# Patient Record
Sex: FEMALE | Race: AMERICAN INDIAN OR ALASKA NATIVE
[De-identification: names, ages, dates, MRNs, and addresses within clinical notes are randomized per-mention and may not be internally consistent; named-entity substitution may affect disease eponyms.]

---

## 2019-06-26 LAB
ALBUMIN SERPL-MCNC: 4 G/DL (ref 3.9–5)
ALT SERPL-CCNC: 23 UNITS/L (ref 7–56)
BASOPHILS # (AUTO): 0.1 K/MM3 (ref 0–0.1)
BASOPHILS NFR BLD AUTO: 1.8 % (ref 0–1.8)
BUN SERPL-MCNC: 13 MG/DL (ref 7–17)
BUN/CREAT SERPL: 19 %
CALCIUM SERPL-MCNC: 9.2 MG/DL (ref 8.4–10.2)
EOSINOPHIL # BLD AUTO: 0.1 K/MM3 (ref 0–0.4)
EOSINOPHIL NFR BLD AUTO: 4.1 % (ref 0–4.3)
HCT VFR BLD CALC: 37.3 % (ref 30.3–42.9)
HDLC SERPL-MCNC: 83 MG/DL (ref 40–59)
HEMOLYSIS INDEX: 8
HGB BLD-MCNC: 12.5 GM/DL (ref 10.1–14.3)
LYMPHOCYTES # BLD AUTO: 1.2 K/MM3 (ref 1.2–5.4)
LYMPHOCYTES NFR BLD AUTO: 41 % (ref 13.4–35)
MCHC RBC AUTO-ENTMCNC: 33 % (ref 30–34)
MCV RBC AUTO: 92 FL (ref 79–97)
MONOCYTES # (AUTO): 0.2 K/MM3 (ref 0–0.8)
MONOCYTES % (AUTO): 7.5 % (ref 0–7.3)
PLATELET # BLD: 244 K/MM3 (ref 140–440)
RBC # BLD AUTO: 4.07 M/MM3 (ref 3.65–5.03)

## 2019-06-26 NOTE — ANESTHESIA CONSULTATION
Anesthesia Consult and Med Hx


Date of service: 07/02/19





- Airway


Anesthetic Teeth Evaluation: Edentulous


ROM Head & Neck: Adequate


Mental/Hyoid Distance: Adequate


Mallampati Class: Class I


Intubation Access Assessment: Good





- Pre-Operative Health Status


ASA Pre-Surgery Classification: ASA2


Proposed Anesthetic Plan: General





- Pulmonary


Hx Asthma: No (HX PE 2011-resolved)


Hx Sleep Apnea: No (Yes, in the past; resolved after weight loss. Neg sleep 

study)





- Cardiovascular System


Hx Hypertension: Yes





- Central Nervous System


Hx Psychiatric Problems: Yes (Depression)





- Gastrointestinal


Hx Gastroesophageal Reflux Disease: Yes





- Hematic


Hx Anemia: Yes

## 2019-07-02 ENCOUNTER — HOSPITAL ENCOUNTER (INPATIENT)
Dept: HOSPITAL 5 - 3A | Age: 64
LOS: 1 days | Discharge: HOME | DRG: 327 | End: 2019-07-03
Attending: SPECIALIST | Admitting: SPECIALIST
Payer: MEDICARE

## 2019-07-02 DIAGNOSIS — K44.9: ICD-10-CM

## 2019-07-02 DIAGNOSIS — I10: ICD-10-CM

## 2019-07-02 DIAGNOSIS — K59.01: ICD-10-CM

## 2019-07-02 DIAGNOSIS — F32.9: ICD-10-CM

## 2019-07-02 DIAGNOSIS — Z90.49: ICD-10-CM

## 2019-07-02 DIAGNOSIS — K30: ICD-10-CM

## 2019-07-02 DIAGNOSIS — K95.89: Primary | ICD-10-CM
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
## 2019-07-02 DIAGNOSIS — Z79.899: ICD-10-CM
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
## 2019-07-02 DIAGNOSIS — Z86.711: ICD-10-CM

## 2019-07-02 DIAGNOSIS — K21.9: ICD-10-CM

## 2019-07-02 DIAGNOSIS — E66.01: ICD-10-CM

## 2019-07-02 PROCEDURE — 84443 ASSAY THYROID STIM HORMONE: CPT

## 2019-07-02 PROCEDURE — 94760 N-INVAS EAR/PLS OXIMETRY 1: CPT

## 2019-07-02 PROCEDURE — 80053 COMPREHEN METABOLIC PANEL: CPT

## 2019-07-02 PROCEDURE — A4217 STERILE WATER/SALINE, 500 ML: HCPCS

## 2019-07-02 PROCEDURE — 80048 BASIC METABOLIC PNL TOTAL CA: CPT

## 2019-07-02 PROCEDURE — 0DB64ZZ EXCISION OF STOMACH, PERCUTANEOUS ENDOSCOPIC APPROACH: ICD-10-PCS | Performed by: SPECIALIST

## 2019-07-02 PROCEDURE — 36415 COLL VENOUS BLD VENIPUNCTURE: CPT

## 2019-07-02 PROCEDURE — 85025 COMPLETE CBC W/AUTO DIFF WBC: CPT

## 2019-07-02 PROCEDURE — 0D160ZA BYPASS STOMACH TO JEJUNUM, OPEN APPROACH: ICD-10-PCS | Performed by: SPECIALIST

## 2019-07-02 PROCEDURE — 0BQT4ZZ REPAIR DIAPHRAGM, PERCUTANEOUS ENDOSCOPIC APPROACH: ICD-10-PCS | Performed by: SPECIALIST

## 2019-07-02 PROCEDURE — 80061 LIPID PANEL: CPT

## 2019-07-02 PROCEDURE — 88307 TISSUE EXAM BY PATHOLOGIST: CPT

## 2019-07-02 RX ADMIN — SODIUM CHLORIDE, SODIUM LACTATE, POTASSIUM CHLORIDE, AND CALCIUM CHLORIDE SCH MLS/HR: .6; .31; .03; .02 INJECTION, SOLUTION INTRAVENOUS at 06:50

## 2019-07-02 RX ADMIN — KETOROLAC TROMETHAMINE SCH MG: 30 INJECTION, SOLUTION INTRAMUSCULAR at 15:38

## 2019-07-02 RX ADMIN — KETOROLAC TROMETHAMINE SCH MG: 30 INJECTION, SOLUTION INTRAMUSCULAR at 22:16

## 2019-07-02 RX ADMIN — HYDROCODONE BITARTRATE AND ACETAMINOPHEN SCH EACH: 10; 325 TABLET ORAL at 22:02

## 2019-07-02 RX ADMIN — SIMETHICONE PRN MG: 80 TABLET, CHEWABLE ORAL at 22:02

## 2019-07-02 RX ADMIN — SIMETHICONE PRN MG: 80 TABLET, CHEWABLE ORAL at 14:36

## 2019-07-02 RX ADMIN — SODIUM CHLORIDE, SODIUM LACTATE, POTASSIUM CHLORIDE, AND CALCIUM CHLORIDE SCH MLS/HR: .6; .31; .03; .02 INJECTION, SOLUTION INTRAVENOUS at 22:03

## 2019-07-02 RX ADMIN — SODIUM CHLORIDE, SODIUM LACTATE, POTASSIUM CHLORIDE, AND CALCIUM CHLORIDE SCH MLS/HR: .6; .31; .03; .02 INJECTION, SOLUTION INTRAVENOUS at 14:30

## 2019-07-02 RX ADMIN — KETOROLAC TROMETHAMINE SCH MG: 30 INJECTION, SOLUTION INTRAMUSCULAR at 10:35

## 2019-07-02 RX ADMIN — HYDROCODONE BITARTRATE AND ACETAMINOPHEN SCH EACH: 10; 325 TABLET ORAL at 19:39

## 2019-07-02 RX ADMIN — SODIUM CHLORIDE, SODIUM LACTATE, POTASSIUM CHLORIDE, AND CALCIUM CHLORIDE SCH MLS/HR: .6; .31; .03; .02 INJECTION, SOLUTION INTRAVENOUS at 11:35

## 2019-07-02 RX ADMIN — METOCLOPRAMIDE PRN MG: 5 INJECTION, SOLUTION INTRAMUSCULAR; INTRAVENOUS at 22:03

## 2019-07-03 VITALS — SYSTOLIC BLOOD PRESSURE: 133 MMHG | DIASTOLIC BLOOD PRESSURE: 56 MMHG

## 2019-07-03 LAB
BASOPHILS # (AUTO): 0 K/MM3 (ref 0–0.1)
BASOPHILS NFR BLD AUTO: 0.4 % (ref 0–1.8)
BUN SERPL-MCNC: 8 MG/DL (ref 7–17)
BUN/CREAT SERPL: 9 %
CALCIUM SERPL-MCNC: 8.5 MG/DL (ref 8.4–10.2)
EOSINOPHIL # BLD AUTO: 0 K/MM3 (ref 0–0.4)
EOSINOPHIL NFR BLD AUTO: 0.4 % (ref 0–4.3)
HCT VFR BLD CALC: 32.5 % (ref 30.3–42.9)
HEMOLYSIS INDEX: 3
HGB BLD-MCNC: 11 GM/DL (ref 10.1–14.3)
LYMPHOCYTES # BLD AUTO: 1.6 K/MM3 (ref 1.2–5.4)
LYMPHOCYTES NFR BLD AUTO: 21.6 % (ref 13.4–35)
MCHC RBC AUTO-ENTMCNC: 34 % (ref 30–34)
MCV RBC AUTO: 91 FL (ref 79–97)
MONOCYTES # (AUTO): 0.6 K/MM3 (ref 0–0.8)
MONOCYTES % (AUTO): 7.8 % (ref 0–7.3)
PLATELET # BLD: 196 K/MM3 (ref 140–440)
RBC # BLD AUTO: 3.57 M/MM3 (ref 3.65–5.03)

## 2019-07-03 RX ADMIN — KETOROLAC TROMETHAMINE SCH: 30 INJECTION, SOLUTION INTRAMUSCULAR at 03:00

## 2019-07-03 RX ADMIN — KETOROLAC TROMETHAMINE SCH: 30 INJECTION, SOLUTION INTRAMUSCULAR at 12:42

## 2019-07-03 RX ADMIN — SODIUM CHLORIDE, SODIUM LACTATE, POTASSIUM CHLORIDE, AND CALCIUM CHLORIDE SCH MLS/HR: .6; .31; .03; .02 INJECTION, SOLUTION INTRAVENOUS at 06:31

## 2019-07-03 RX ADMIN — SIMETHICONE PRN MG: 80 TABLET, CHEWABLE ORAL at 08:24

## 2019-07-03 RX ADMIN — METOCLOPRAMIDE PRN MG: 5 INJECTION, SOLUTION INTRAMUSCULAR; INTRAVENOUS at 08:24

## 2019-07-03 RX ADMIN — HYDROCODONE BITARTRATE AND ACETAMINOPHEN SCH EACH: 10; 325 TABLET ORAL at 09:36

## 2019-07-03 NOTE — DISCHARGE SUMMARY
Providers





- Providers


Date of Admission: 


07/02/19 06:07





Date of discharge: 07/03/19


Attending physician: 


HILDA SAHA





Primary care physician: 


CARMEN LEUNG








Hospitalization


Reason for admission: postop


Condition: Good


Procedures: 





7/2/19: Laparoscopic partial gastrectomy, HHR


Hospital course: 





64F admitted after her operation for routine monitoring. She was managed on the 

general surgical floor. Her BP was high after surgery, this resolved with 

medication and was lower on the day of discharge. Patient ambulated, tolerated a

CLD, pain under control, and was dc home POD1. 


Disposition: DC-01 TO HOME OR SELFCARE





Core Measure Documentation





- Palliative Care


Palliative Care/ Comfort Measures: Not Applicable





- Core Measures


Any of the following diagnoses?: none





- VTE Discharge Requirements


Deep Vein Thrombosis/Pulmonary Embolism Present on Admission: No





- Acute MI Discharge Requirements


Aspirin at discharge: No


Reason for no aspirin on DC: Surgical contraindication





- Heart Failure Discharge Requirements


ACE/ARB for LVSD if EF <40%: Not Applicable





- Stroke Discharge Requirements


Statin for LDL = or >70 mg/dl on DC: Not Applicable





Exam





- Physical Exam


Narrative exam: 





Gen: AAO, NAD


Heart: RRR


Lungs: Clear


Abd: Obese, soft, NT, ND. Bandages c/d/i. 


Ext: No LE Edema





- Constitutional


Vitals: 


                                        











Temp Pulse Resp BP Pulse Ox


 


 98.5 F   61   20   91/56   99 


 


 07/03/19 00:20  07/03/19 04:57  07/03/19 04:57  07/03/19 04:57  07/03/19 00:20














Plan


Diet: clear liquids


Wound: keep clean and dry


Special Instructions: no heavy lifting


Additional Instructions: Erasmo Saha as scheduled


Follow up with: 


CARMEN LEUNG MD [Primary Care Provider] - 7 Days

## 2019-07-04 ENCOUNTER — HOSPITAL ENCOUNTER (INPATIENT)
Dept: HOSPITAL 5 - ED | Age: 64
LOS: 3 days | Discharge: HOME | DRG: 394 | End: 2019-07-07
Attending: INTERNAL MEDICINE | Admitting: INTERNAL MEDICINE
Payer: MEDICARE

## 2019-07-04 DIAGNOSIS — J94.8: ICD-10-CM

## 2019-07-04 DIAGNOSIS — K95.89: Primary | ICD-10-CM
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
## 2019-07-04 DIAGNOSIS — K21.0: ICD-10-CM

## 2019-07-04 DIAGNOSIS — Z90.49: ICD-10-CM

## 2019-07-04 DIAGNOSIS — Z82.49: ICD-10-CM
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
## 2019-07-04 DIAGNOSIS — E66.01: ICD-10-CM

## 2019-07-04 DIAGNOSIS — K22.2: ICD-10-CM

## 2019-07-04 DIAGNOSIS — F32.9: ICD-10-CM

## 2019-07-04 DIAGNOSIS — Z86.711: ICD-10-CM

## 2019-07-04 DIAGNOSIS — Z79.899: ICD-10-CM

## 2019-07-04 LAB
BASOPHILS # (AUTO): 0 K/MM3 (ref 0–0.1)
BASOPHILS NFR BLD AUTO: 0.7 % (ref 0–1.8)
BUN SERPL-MCNC: 8 MG/DL (ref 7–17)
BUN/CREAT SERPL: 11 %
CALCIUM SERPL-MCNC: 9 MG/DL (ref 8.4–10.2)
EOSINOPHIL # BLD AUTO: 0.2 K/MM3 (ref 0–0.4)
EOSINOPHIL NFR BLD AUTO: 3.6 % (ref 0–4.3)
HCT VFR BLD CALC: 35.2 % (ref 30.3–42.9)
HEMOLYSIS INDEX: 8
HGB BLD-MCNC: 12 GM/DL (ref 10.1–14.3)
LYMPHOCYTES # BLD AUTO: 1.2 K/MM3 (ref 1.2–5.4)
LYMPHOCYTES NFR BLD AUTO: 20.7 % (ref 13.4–35)
MCHC RBC AUTO-ENTMCNC: 34 % (ref 30–34)
MCV RBC AUTO: 92 FL (ref 79–97)
MONOCYTES # (AUTO): 0.5 K/MM3 (ref 0–0.8)
MONOCYTES % (AUTO): 7.9 % (ref 0–7.3)
PLATELET # BLD: 214 K/MM3 (ref 140–440)
RBC # BLD AUTO: 3.84 M/MM3 (ref 3.65–5.03)

## 2019-07-04 PROCEDURE — 93005 ELECTROCARDIOGRAM TRACING: CPT

## 2019-07-04 PROCEDURE — 71045 X-RAY EXAM CHEST 1 VIEW: CPT

## 2019-07-04 PROCEDURE — 96375 TX/PRO/DX INJ NEW DRUG ADDON: CPT

## 2019-07-04 PROCEDURE — 93010 ELECTROCARDIOGRAM REPORT: CPT

## 2019-07-04 PROCEDURE — 96374 THER/PROPH/DIAG INJ IV PUSH: CPT

## 2019-07-04 PROCEDURE — 84484 ASSAY OF TROPONIN QUANT: CPT

## 2019-07-04 PROCEDURE — 80048 BASIC METABOLIC PNL TOTAL CA: CPT

## 2019-07-04 PROCEDURE — 74177 CT ABD & PELVIS W/CONTRAST: CPT

## 2019-07-04 PROCEDURE — 80053 COMPREHEN METABOLIC PANEL: CPT

## 2019-07-04 PROCEDURE — 83735 ASSAY OF MAGNESIUM: CPT

## 2019-07-04 PROCEDURE — 85025 COMPLETE CBC W/AUTO DIFF WBC: CPT

## 2019-07-04 PROCEDURE — 85027 COMPLETE CBC AUTOMATED: CPT

## 2019-07-04 PROCEDURE — C9113 INJ PANTOPRAZOLE SODIUM, VIA: HCPCS

## 2019-07-04 PROCEDURE — 83036 HEMOGLOBIN GLYCOSYLATED A1C: CPT

## 2019-07-04 PROCEDURE — 36415 COLL VENOUS BLD VENIPUNCTURE: CPT

## 2019-07-04 RX ADMIN — HYDROMORPHONE HYDROCHLORIDE PRN MG: 1 INJECTION, SOLUTION INTRAMUSCULAR; INTRAVENOUS; SUBCUTANEOUS at 23:38

## 2019-07-04 RX ADMIN — ONDANSETRON PRN MG: 2 INJECTION INTRAMUSCULAR; INTRAVENOUS at 20:29

## 2019-07-04 RX ADMIN — HYDROMORPHONE HYDROCHLORIDE PRN MG: 1 INJECTION, SOLUTION INTRAMUSCULAR; INTRAVENOUS; SUBCUTANEOUS at 20:29

## 2019-07-04 RX ADMIN — Medication SCH ML: at 23:38

## 2019-07-04 RX ADMIN — HEPARIN SODIUM SCH UNIT: 5000 INJECTION, SOLUTION INTRAVENOUS; SUBCUTANEOUS at 21:26

## 2019-07-04 RX ADMIN — HYDROMORPHONE HYDROCHLORIDE PRN MG: 1 INJECTION, SOLUTION INTRAMUSCULAR; INTRAVENOUS; SUBCUTANEOUS at 20:32

## 2019-07-04 RX ADMIN — ONDANSETRON PRN MG: 2 INJECTION INTRAMUSCULAR; INTRAVENOUS at 20:31

## 2019-07-04 NOTE — CAT SCAN REPORT
CT abdomen pelvis w con



INDICATION:

MAIN: Abdominal surgery (previous gastric bypass and going in again) on Tues.  N/V since Tuesday..



TECHNIQUE:

All CT scans at this location are performed using the following dose modulation technique: Automated 
exposure control. Helical slices were obtained through the abdomen and pelvis. 100 cc of Omnipaque 30
0 is administered. 



COMPARISON:

None available.



FINDINGS:

Abdomen: There is mild acetabular atelectasis in the lungs. Changes of gastric bypass are noted. Esop
hagus is distended with air with a very small amount of layering fluid in the midesophagus. There is 
a small amount of air in the right abdominal wall. No free intraperitoneal air is seen. Changes of pr
ior cholecystectomy are noted. There is an IVC filter.



Small cyst in the upper pole the left kidney and a small cyst in the mid right kidney. There is no ad
enopathy.





On review of bone windows, no acute osseous abnormalities are seen.





Pelvis: There is no obstruction or inflammation. Bowel is unremarkable. There are phleboliths.



IMPRESSION: Changes of gastric bypass are noted. The esophagus is distended with air and contains a v
tito small amount of fluid. This could represent reflux. Possibility of obstruction or stricture at th
e level the gastric remnant is considered as well.



There is no free air. There are no abnormal fluid collections.



There is a small amount of gas in the dominant wall right.



Signer Name: Chris Rush MD 

Signed: 7/4/2019 12:15 PM

 Workstation Name: [id number]

## 2019-07-04 NOTE — XRAY REPORT
CHEST 1 VIEW 



INDICATION / CLINICAL INFORMATION:

Chest Pain.



COMPARISON: 

None available.



FINDINGS: Patient is rotated.



SUPPORT DEVICES: None.

HEART / MEDIASTINUM: No significant abnormality. 

LUNGS / PLEURA: There is blunting of the right costophrenic angle characteristic of a small amount of
 pleural fluid or pleural thickening..  No pneumothorax. There is minimal linear scar or atelectasis 
in the left lower lung zone



ADDITIONAL FINDINGS: No significant additional findings.



IMPRESSION:

1. There is a small amount of right pleural fluid or pleural thickening. No pneumothorax is seen.



Signer Name: Chris Rush MD 

Signed: 7/4/2019 10:17 AM

 Workstation Name: [id number]

## 2019-07-04 NOTE — EMERGENCY DEPARTMENT REPORT
ED General Adult HPI





- General


Chief complaint: Chest Pain


Stated complaint: CHEST DISCOMFORT/CAN'T EAT


Time Seen by Provider: 07/04/19 09:48


Source: patient


Mode of arrival: Ambulatory


Limitations: No Limitations





- History of Present Illness


Initial comments: 


She is a 64-year-old female status post gastric bypass and hernia repair who 

presents with nausea and vomiting and inability to eat.  Patient states that 

ever since she had her surgery she's not been able to swallow any food or liquid

down.  Patient was seen by Dr. Saha she states that








- Related Data


                                Home Medications











 Medication  Instructions  Recorded  Confirmed  Last Taken


 


HYDROcodone/APAP  [Norco 1 tab PO BID 06/28/19 06/28/19 07/01/19 21:00





 mg TAB]    


 


Losartan/Hydrochlorothiazide 1 each PO DAILY 06/28/19 06/28/19 07/01/19 21:00





[Hyzaar 100-12.5 Tablet]    


 


Ranitidine HCl [Zantac] 150 mg PO DAILY 06/28/19 06/28/19 07/01/19 21:00


 


amLODIPine [Norvasc] 10 mg PO DAILY 06/28/19 07/02/19 07/02/19 05:15


 


buPROPion [Wellbutrin] 200 mg PO DAILY 06/28/19 06/28/19 07/01/19 21:00











                                    Allergies











Allergy/AdvReac Type Severity Reaction Status Date / Time


 


No Known Allergies Allergy   Verified 07/04/19 09:28














ED Review of Systems


ROS: 


Stated complaint: CHEST DISCOMFORT/CAN'T EAT


Other details as noted in HPI





Constitutional: denies: chills, fever


Eyes: denies: eye pain, eye discharge, vision change


ENT: denies: ear pain, throat pain


Respiratory: denies: cough, shortness of breath, wheezing


Cardiovascular: chest pain.  denies: palpitations


Endocrine: no symptoms reported


Gastrointestinal: nausea, vomiting.  denies: abdominal pain, diarrhea


Genitourinary: denies: urgency, dysuria, discharge


Musculoskeletal: denies: back pain, joint swelling, arthralgia


Skin: denies: rash, lesions


Neurological: denies: headache, weakness, paresthesias


Psychiatric: denies: anxiety, depression


Hematological/Lymphatic: denies: easy bleeding, easy bruising





ED Past Medical Hx





- Past Medical History


Hx Hypertension: Yes


Hx Pulmonary Embolism: Yes (bilateral 2011)


Hx GERD: Yes


Hx Arthritis: Yes


Hx Asthma: No (HX PE 2011-resolved)





- Surgical History


Hx Cholecystectomy: Yes


Additional Surgical History: gastrectomy, hernia repair





- Social History


Smoking Status: Never Smoker


Substance Use Type: None





- Medications


Home Medications: 


                                Home Medications











 Medication  Instructions  Recorded  Confirmed  Last Taken  Type


 


HYDROcodone/APAP  [Norco 1 tab PO BID 06/28/19 06/28/19 07/01/19 21:00 

History





 mg TAB]     


 


Losartan/Hydrochlorothiazide 1 each PO DAILY 06/28/19 06/28/19 07/01/19 21:00 

History





[Hyzaar 100-12.5 Tablet]     


 


Ranitidine HCl [Zantac] 150 mg PO DAILY 06/28/19 06/28/19 07/01/19 21:00 History


 


amLODIPine [Norvasc] 10 mg PO DAILY 06/28/19 07/02/19 07/02/19 05:15 History


 


buPROPion [Wellbutrin] 200 mg PO DAILY 06/28/19 06/28/19 07/01/19 21:00 History














ED Physical Exam





- General


Limitations: No Limitations


General appearance: alert, in no apparent distress





- Head


Head exam: Present: atraumatic, normocephalic





- Eye


Eye exam: Present: normal appearance





- ENT


ENT exam: Present: mucous membranes moist





- Neck


Neck exam: Present: normal inspection





- Respiratory


Respiratory exam: Present: normal lung sounds bilaterally.  Absent: respiratory 

distress





- Cardiovascular


Cardiovascular Exam: Present: regular rate, normal rhythm.  Absent: systolic 

murmur, diastolic murmur, rubs, gallop





- GI/Abdominal


GI/Abdominal exam: Present: soft, normal bowel sounds





- Extremities Exam


Extremities exam: Present: normal inspection





- Back Exam


Back exam: Present: normal inspection





- Neurological Exam


Neurological exam: Present: alert, oriented X3





- Psychiatric


Psychiatric exam: Present: normal affect, normal mood





- Skin


Skin exam: Present: warm, dry, intact, normal color.  Absent: rash





ED Course


                                   Vital Signs











  07/04/19





  09:33


 


Temperature 98 F


 


Pulse Rate 65


 


Respiratory 16





Rate 


 


Blood Pressure 149/69





[Left] 


 


O2 Sat by Pulse 99





Oximetry 














- Consultations


Consultation #1: 





07/04/19 13:46


Consulted with Dr. Rosa she willsee the patient in the morning. 





ED Medical Decision Making





- Lab Data


Result diagrams: 


                                 07/04/19 10:00





                                 07/04/19 10:00








                                   Lab Results











  07/04/19 07/04/19 Range/Units





  10:00 10:00 


 


WBC  5.8   (4.5-11.0)  K/mm3


 


RBC  3.84   (3.65-5.03)  M/mm3


 


Hgb  12.0   (10.1-14.3)  gm/dl


 


Hct  35.2   (30.3-42.9)  %


 


MCV  92   (79-97)  fl


 


MCH  31   (28-32)  pg


 


MCHC  34   (30-34)  %


 


RDW  14.4   (13.2-15.2)  %


 


Plt Count  214   (140-440)  K/mm3


 


Lymph % (Auto)  20.7   (13.4-35.0)  %


 


Mono % (Auto)  7.9 H   (0.0-7.3)  %


 


Eos % (Auto)  3.6   (0.0-4.3)  %


 


Baso % (Auto)  0.7   (0.0-1.8)  %


 


Lymph #  1.2   (1.2-5.4)  K/mm3


 


Mono #  0.5   (0.0-0.8)  K/mm3


 


Eos #  0.2   (0.0-0.4)  K/mm3


 


Baso #  0.0   (0.0-0.1)  K/mm3


 


Seg Neutrophils %  67.1   (40.0-70.0)  %


 


Seg Neutrophils #  3.9   (1.8-7.7)  K/mm3


 


Sodium   137  (137-145)  mmol/L


 


Potassium   3.8  (3.6-5.0)  mmol/L


 


Chloride   100.4  ()  mmol/L


 


Carbon Dioxide   24  (22-30)  mmol/L


 


Anion Gap   16  mmol/L


 


BUN   8  (7-17)  mg/dL


 


Creatinine   0.7  (0.7-1.2)  mg/dL


 


Estimated GFR   > 60  ml/min


 


BUN/Creatinine Ratio   11  %


 


Glucose   76  ()  mg/dL


 


Calcium   9.0  (8.4-10.2)  mg/dL


 


Troponin T   < 0.010  (0.00-0.029)  ng/mL














- EKG Data


-: EKG Interpreted by Me





- EKG Data





07/04/19 13:07


CT scan shows normal sinus rhythm no ST segment elevation or T-wave inversion 

normal axis.





- Radiology Data


Radiology results: report reviewed, image reviewed





CT scan: Shows stricture in the esophagus. 





- Medical Decision Making


Medical diagnosis esophageal stricture


Differential diagnosis: Small bowel obstruction, GERD, reflux, postsurgical 

complication excellent


I will consult with surgeon LOBE work IV PAIN MEDICATION IV PEPCID AND IV FLUIDS

 AND WILL ADMIT THE PATIENT TO THE HOSPITAL





Critical care attestation.: 


If time is entered above; I have spent that time in minutes in the direct care 

of this critically ill patient, excluding procedure time.








ED Disposition


Clinical Impression: 


 Esophageal stricture





Nausea & vomiting


Qualifiers:


 Vomiting type: unspecified Vomiting Intractability: unspecified Qualified 

Code(s): R11.2 - Nausea with vomiting, unspecified





Disposition: DC-09 OP ADMIT IP TO THIS HOSP


Is pt being admited?: No


Does the pt Need Aspirin: No


Condition: Stable


Referrals: 


[facility], MD [Primary Care Provider] - 3-5 Days

## 2019-07-05 LAB
ALBUMIN SERPL-MCNC: 3 G/DL (ref 3.9–5)
ALT SERPL-CCNC: 21 UNITS/L (ref 7–56)
BASOPHILS # (AUTO): 0 K/MM3 (ref 0–0.1)
BASOPHILS NFR BLD AUTO: 0.6 % (ref 0–1.8)
BUN SERPL-MCNC: 6 MG/DL (ref 7–17)
BUN/CREAT SERPL: 10 %
CALCIUM SERPL-MCNC: 8.7 MG/DL (ref 8.4–10.2)
EOSINOPHIL # BLD AUTO: 0.4 K/MM3 (ref 0–0.4)
EOSINOPHIL NFR BLD AUTO: 9 % (ref 0–4.3)
HCT VFR BLD CALC: 32.9 % (ref 30.3–42.9)
HEMOLYSIS INDEX: 28
HGB BLD-MCNC: 11.1 GM/DL (ref 10.1–14.3)
LYMPHOCYTES # BLD AUTO: 1.2 K/MM3 (ref 1.2–5.4)
LYMPHOCYTES NFR BLD AUTO: 26.4 % (ref 13.4–35)
MCHC RBC AUTO-ENTMCNC: 34 % (ref 30–34)
MCV RBC AUTO: 92 FL (ref 79–97)
MONOCYTES # (AUTO): 0.5 K/MM3 (ref 0–0.8)
MONOCYTES % (AUTO): 12.1 % (ref 0–7.3)
PLATELET # BLD: 187 K/MM3 (ref 140–440)
RBC # BLD AUTO: 3.58 M/MM3 (ref 3.65–5.03)

## 2019-07-05 RX ADMIN — PANTOPRAZOLE SODIUM SCH MG: 40 INJECTION, POWDER, FOR SOLUTION INTRAVENOUS at 21:05

## 2019-07-05 RX ADMIN — ONDANSETRON PRN MG: 2 INJECTION INTRAMUSCULAR; INTRAVENOUS at 02:41

## 2019-07-05 RX ADMIN — HYDROMORPHONE HYDROCHLORIDE PRN MG: 1 INJECTION, SOLUTION INTRAMUSCULAR; INTRAVENOUS; SUBCUTANEOUS at 11:23

## 2019-07-05 RX ADMIN — Medication SCH ML: at 21:14

## 2019-07-05 RX ADMIN — DEXTROSE AND SODIUM CHLORIDE SCH MLS/HR: 5; .9 INJECTION, SOLUTION INTRAVENOUS at 21:07

## 2019-07-05 RX ADMIN — HYDROMORPHONE HYDROCHLORIDE PRN MG: 1 INJECTION, SOLUTION INTRAMUSCULAR; INTRAVENOUS; SUBCUTANEOUS at 02:41

## 2019-07-05 RX ADMIN — ONDANSETRON PRN MG: 2 INJECTION INTRAMUSCULAR; INTRAVENOUS at 20:11

## 2019-07-05 RX ADMIN — HYDROMORPHONE HYDROCHLORIDE PRN MG: 1 INJECTION, SOLUTION INTRAMUSCULAR; INTRAVENOUS; SUBCUTANEOUS at 20:10

## 2019-07-05 RX ADMIN — HEPARIN SODIUM SCH UNIT: 5000 INJECTION, SOLUTION INTRAVENOUS; SUBCUTANEOUS at 11:30

## 2019-07-05 RX ADMIN — HYDROMORPHONE HYDROCHLORIDE PRN MG: 1 INJECTION, SOLUTION INTRAMUSCULAR; INTRAVENOUS; SUBCUTANEOUS at 15:43

## 2019-07-05 RX ADMIN — ONDANSETRON PRN MG: 2 INJECTION INTRAMUSCULAR; INTRAVENOUS at 11:32

## 2019-07-05 RX ADMIN — PANTOPRAZOLE SODIUM SCH MG: 40 INJECTION, POWDER, FOR SOLUTION INTRAVENOUS at 10:23

## 2019-07-05 RX ADMIN — Medication SCH ML: at 11:25

## 2019-07-05 RX ADMIN — HEPARIN SODIUM SCH UNIT: 5000 INJECTION, SOLUTION INTRAVENOUS; SUBCUTANEOUS at 21:05

## 2019-07-05 RX ADMIN — ONDANSETRON PRN MG: 2 INJECTION INTRAMUSCULAR; INTRAVENOUS at 15:43

## 2019-07-05 NOTE — GASTROENTEROLOGY CONSULTATION
History of Present Illness





- Reason for Consult


Consult date: 07/05/19


dysphagia


Requesting physician: KRISTOPHER CARDOSO





- History of Present Illness





This is a 63 yo female with recent lap gastrojejunostomy revision and hiatal 

hernia repair on 7/2/2019 with Dr. Saha admitted for nausea/vomiting. She 

reports when she went home, she was spitting up foam and could not keep anything

done. Work up so far with CT abdomen/pelvis with IV contrast showing a dilated 

esophagus with air/fluid levels and a tight anastomosis. She has been on clear 

liquids. 


Today, she reports she feels better and able to keep water and liquids now. She 

is not spitting up foamy liquid as before. Abdominal pain stable since surgery. 







Past History


Past Medical History: hypertension


Past Surgical History: Other (LRYGB 2012; Laparoscopic GJ revision with Hiatal 

hernia repair 7/2/19)


Social history: full code


Family history: no significant family history





Medications and Allergies


                                    Allergies











Allergy/AdvReac Type Severity Reaction Status Date / Time


 


No Known Allergies Allergy   Verified 07/04/19 09:28











                                Home Medications











 Medication  Instructions  Recorded  Confirmed  Last Taken  Type


 


HYDROcodone/APAP  [Norco 1 tab PO BID 06/28/19 07/04/19 07/01/19 21:00 

History





 mg TAB]     


 


Losartan/Hydrochlorothiazide 1 each PO DAILY 06/28/19 07/04/19 07/01/19 21:00 

History





[Hyzaar 100-12.5 Tablet]     


 


Ranitidine HCl [Zantac] 150 mg PO DAILY 06/28/19 07/04/19 07/01/19 21:00 History


 


amLODIPine [Norvasc] 10 mg PO DAILY 06/28/19 07/04/19 07/02/19 05:15 History


 


buPROPion [Wellbutrin] 200 mg PO DAILY 06/28/19 07/04/19 07/01/19 21:00 History











Active Meds: 


Active Medications





Acetaminophen (Tylenol)  650 mg PO Q4H PRN


   PRN Reason: Pain MILD(1-3)/Fever >100.5/HA


Heparin Sodium (Porcine) (Heparin)  5,000 unit SUB-Q Q12HR [facility]


   Last Admin: 07/05/19 11:30 Dose:  5,000 unit


   Documented by: 


Hydromorphone HCl (Dilaudid)  1 mg IV Q3H PRN


   PRN Reason: Pain , Severe (7-10)


   Last Admin: 07/05/19 15:43 Dose:  1 mg


   Documented by: 


Dextrose/Sodium Chloride (D5ns)  1,000 mls @ 100 mls/hr IV AS DIRECT [facility]


Ondansetron HCl (Zofran)  4 mg IV Q4H PRN


   PRN Reason: Nausea And Vomiting


   Last Admin: 07/05/19 15:43 Dose:  4 mg


   Documented by: 


Pantoprazole Sodium (Protonix)  40 mg IV BID [facility]


   Last Admin: 07/05/19 10:23 Dose:  40 mg


   Documented by: 


Sodium Chloride (Sodium Chloride Flush Syringe 10 Ml)  10 ml IV BID [facility]


   Last Admin: 07/05/19 11:25 Dose:  10 ml


   Documented by: 


Sodium Chloride (Sodium Chloride Flush Syringe 10 Ml)  10 ml IV PRN PRN


   PRN Reason: LINE FLUSH








Medication list reviewed and updated. 





Review of Systems





- Review of Systems


All systems: negative


Eyes: no change in vision


Gastrointestinal: abdominal pain, nausea, vomiting, loss of appetite, heartburn,

 belching, no BRBPR, no melena





Exam





- Constitutional


Vital Signs: 


                                        











Temp Pulse Resp BP Pulse Ox


 


 98.3 F   56 L  18   116/58   97 


 


 07/05/19 16:27  07/05/19 16:27  07/05/19 16:27  07/05/19 16:27  07/05/19 16:27











General appearance: no acute distress, well-nourished





- EENT


ENT: clear oral mucosa, dentition normal





- Neck


Neck: supple, normal ROM, no masses or JVD





- Respiratory


Respiratory effort: normal


Respiratory: bilateral: CTA





- Breasts


Breasts: deferred





- Cardiovascular


Rhythm: regular


Heart Sounds: Present: S1 & S2.  Absent: gallop, rub


Extremities: pulses intact, No edema, normal color, Full ROM





- Gastrointestinal


General gastrointestinal: Present: soft, tender, non-distended, normal bowel 

sounds





- Integumentary


Integumentary: Present: clear, warm, dry





- Neurologic


Neurological: alert and oriented x3





- Psychiatric


Psychiatric: appropriate mood/affect, intact judgment & insight, memory intact





- Labs


CBC & Chem 7: 


                                 07/05/19 07:38





                                 07/05/19 07:38


Lab Results: 


                         Laboratory Results - last 24 hr











  07/04/19 07/05/19 07/05/19





  10:00 07:38 07:38


 


WBC   4.4 L 


 


RBC   3.58 L 


 


Hgb   11.1 


 


Hct   32.9 


 


MCV   92 


 


MCH   31 


 


MCHC   34 


 


RDW   14.7 


 


Plt Count   187 


 


Lymph % (Auto)   26.4 


 


Mono % (Auto)   12.1 H 


 


Eos % (Auto)   9.0 H 


 


Baso % (Auto)   0.6 


 


Lymph #   1.2 


 


Mono #   0.5 


 


Eos #   0.4 


 


Baso #   0.0 


 


Seg Neutrophils %   51.9 


 


Seg Neutrophils #   2.3 


 


Sodium    138


 


Potassium    3.7


 


Chloride    102.5


 


Carbon Dioxide    24


 


Anion Gap    15


 


BUN    6 L


 


Creatinine    0.6 L


 


Estimated GFR    > 60


 


BUN/Creatinine Ratio    10


 


Glucose    122 H


 


Hemoglobin A1c  4.7  


 


Calcium    8.7


 


Total Bilirubin    0.50


 


AST    22


 


ALT    21


 


Alkaline Phosphatase    49


 


Total Protein    6.2 L


 


Albumin    3.0 L


 


Albumin/Globulin Ratio    0.9














- Imaging


CT Scan: report reviewed





Assessment and Plan





63 yo female with recent lap gastrojejunostomy revision and hiatal hernia repair

 on 7/2/2019 with Dr. Saha admitted for nausea/vomiting.





# Nausea/vomiting


- likely 2/2 post surgical changes and edema at the distal esophagus and repair 

site. 


- currently tolerating clear liquids. 


- will defer to bariatric surgery service for further management. 


- no plans for endoscopy with GI. 


- will sign off. please call with questions.

## 2019-07-05 NOTE — CONSULTATION
History of Present Illness





- Reason for Consult


Consult date: 07/05/19


nausea, vomiting


Requesting physician: IVAN ANTONIO





- History of Present Illness





64F who underwent recent laparoscopic gastrojejunostomy revision and hiatal 

hernia repair 7/2/19 by Dr Saha readmitted with nausea, vomiting, and 

inability for po intake on POD#2. She was discharged home 7/3 after an 

uneventful hospital stay. Patient reports that after she went home, she 

gradually started to spit up foam and was unable to get her liquids down. She 

states that this happened after she took 4 large pills. Denies F/C/SOB/diarrhea 

or constipation. She started having chest pressure and a choking sensation and 

presented herself back to the ER for evaluation. 





In the ER, labs were WNL. A CT abdpelvis with IV contrast demonstrated a dilated

esophagus with air/fluid levels and a tight anastomosis. There was no free air, 

and other findings were usual postoperative changes. She was given IVFs and 

admitted to the general surgical floor under the hospitalist. 





Today, she reports feeling marginally better. She is still spitting up a lot of 

foam, but feels like the IVFs helped her. She's afraid to eat anything or 

swallow anything. 





Past History


Past Medical History: hypertension


Past Surgical History: Other (LRYGB 2012; Laparoscopic GJ revision with Hiatal 

hernia repair 7/2/19)


Social history: full code


Family history: no significant family history





Medications and Allergies


                                    Allergies











Allergy/AdvReac Type Severity Reaction Status Date / Time


 


No Known Allergies Allergy   Verified 07/04/19 09:28











                                Home Medications











 Medication  Instructions  Recorded  Confirmed  Last Taken  Type


 


HYDROcodone/APAP  [Norco 1 tab PO BID 06/28/19 07/04/19 07/01/19 21:00 

History





 mg TAB]     


 


Losartan/Hydrochlorothiazide 1 each PO DAILY 06/28/19 07/04/19 07/01/19 21:00 

History





[Hyzaar 100-12.5 Tablet]     


 


Ranitidine HCl [Zantac] 150 mg PO DAILY 06/28/19 07/04/19 07/01/19 21:00 History


 


amLODIPine [Norvasc] 10 mg PO DAILY 06/28/19 07/04/19 07/02/19 05:15 History


 


buPROPion [Wellbutrin] 200 mg PO DAILY 06/28/19 07/04/19 07/01/19 21:00 History











Active Meds: 


Active Medications





Acetaminophen (Tylenol)  650 mg PO Q4H PRN


   PRN Reason: Pain MILD(1-3)/Fever >100.5/HA


Heparin Sodium (Porcine) (Heparin)  5,000 unit SUB-Q Q12HR [facility]


   Last Admin: 07/04/19 21:26 Dose:  5,000 unit


   Documented by: 


Hydromorphone HCl (Dilaudid)  1 mg IV Q3H PRN


   PRN Reason: Pain , Severe (7-10)


   Last Admin: 07/05/19 02:41 Dose:  1 mg


   Documented by: 


Dextrose/Sodium Chloride (D5ns)  1,000 mls @ 100 mls/hr IV AS DIRECT [facility]


Ondansetron HCl (Zofran)  4 mg IV Q8H PRN


   PRN Reason: Nausea And Vomiting


   Last Admin: 07/05/19 02:41 Dose:  4 mg


   Documented by: 


Pantoprazole Sodium (Protonix)  40 mg IV BID [facility]


Sodium Chloride (Sodium Chloride Flush Syringe 10 Ml)  10 ml IV BID [facility]


   Last Admin: 07/04/19 23:38 Dose:  10 ml


   Documented by: 


Sodium Chloride (Sodium Chloride Flush Syringe 10 Ml)  10 ml IV PRN PRN


   PRN Reason: LINE FLUSH











Review of Systems


All systems: negative (as indicated in HPI)





Exam





- Physical Exam


Narrative exam: 





Gen: AAO, NAD


HEENT: Trachea midline, no masses


Heart: RRR


Lungs: CTAB, no wheezes, no rales


Abd: Soft, obese, NT, ND. Bandages c/d/i.


Ext: No LE edema





- Constitutional


Vitals: 


                                        











Temp Pulse Resp BP Pulse Ox


 


 98.2 F   55 L  18   104/54   96 


 


 07/05/19 07:50  07/05/19 07:50  07/05/19 07:50  07/05/19 07:50  07/05/19 07:50














Results





- Labs


CBC & Chem 7: 


                                 07/05/19 07:38





                                 07/05/19 07:38


Labs: 


                              Abnormal lab results











  07/05/19 07/05/19 Range/Units





  07:38 07:38 


 


WBC  4.4 L   (4.5-11.0)  K/mm3


 


RBC  3.58 L   (3.65-5.03)  M/mm3


 


Mono % (Auto)  12.1 H   (0.0-7.3)  %


 


Eos % (Auto)  9.0 H   (0.0-4.3)  %


 


BUN   6 L  (7-17)  mg/dL


 


Creatinine   0.6 L  (0.7-1.2)  mg/dL


 


Glucose   122 H  ()  mg/dL


 


Total Protein   6.2 L  (6.3-8.2)  g/dL


 


Albumin   3.0 L  (3.9-5)  g/dL














- Imaging and Cardiology


Chest x-ray: report reviewed


CT scan - abdomen: report reviewed, image reviewed


CT scan - pelvis: report reviewed, image reviewed





Assessment and Plan





64F patient of Dr Saha s/p lap GJ revision with HHR 7/2/19, readmitted with 

nausea and vomiting, likely from GJ edema.





#1 Nausea/vomiting


#2 GJ edema 


#3 s/p lap GJ revision/HHR 7/2


- Images were reviewed, the swelling should decrease with minimal po intake and 

IVFs after a few days. No surgical intervention or endoscopy is needed. I 

discussed the findings of the CT scan with the patient and the likely etiology 

of her symptoms. All her questions were answered.


- Patient may have a bariatric clear liquid diet as tolerated


- IV famotidine


- Cont IVFs


- all medications should be crushed 


- ambulate ad lonnie


- IS to patient 








Thank you for the consult. Will follow.





Hugh Lazcano MD


Bariatric Fellow

## 2019-07-05 NOTE — PROGRESS NOTE
Assessment and Plan


Assessment and plan: 


Patient is a 64 yo woman with a history of HTN, PE in 2011, OA, Depression, 

GERD, status post gastric bypass and hernia repair who presents with nausea, 

vomiting and inability to eat after Gastric bypass revision surgery on July 2 nd 2019.





* pCXR Impression: There is a small amount of right pleural fluid or pleural 

  thickening, no pneumothorax is seen. 


* CT abd/pelvis with IV IMPRESSION: Changes of gastric bypass are noted. The 

  esophagus is distended with air and contains a very small amount of fluid. 

  This could represent reflux. Possibility of obstruction or stricture at the 

  level the gastric remnant is considered as well. There is no free air. There 

  are no abnormal fluid collections. There is a small amount of gas in the 

  dominant wall right. 





(1) Esophageal stricture


Current Visit: Yes   Status: Acute   


Plan to address problem: 


IV protonix initiated


Clear Liquid diet


Dr Daigle group to see 


Consulted GI


IV Zofran and Reglan





(2) Post-operative complication


Current Visit: Yes   Status: Acute   


Qualifiers: 


   Surgical complication system/body Area: digestive system 


Plan to address problem: 


As above in Esophageal stricture





(3) HTN (hypertension)


Current Visit: Yes   Status: Chronic   


Qualifiers: 


   Hypertension type: essential hypertension   Qualified Code(s): I10 - 

Essential (primary) hypertension   


Plan to address problem: 


Cont anihypertensives





(4) GERD (gastroesophageal reflux disease)


Current Visit: Yes   Status: Chronic   


Qualifiers: 


   Esophagitis presence: with esophagitis   Qualified Code(s): K21.0 - Gastro-

esophageal reflux disease with esophagitis   


Plan to address problem: 


On IV Protonix





(5) Depression


Current Visit: Yes   Status: Chronic   


Qualifiers: 


   Depression Type: unspecified   Qualified Code(s): F32.9 - Major depressive 

disorder, single episode, unspecified   


Plan to address problem: 


Cont wellbutrin





(6) DVT prophylaxis


Current Visit: Yes   Status: Acute   


Plan to address problem: 


On Lovernox











History


Interval history: 


Patient was seen and examined. Follow-up on current diagnosis of N/V. No 

overnight events reported to me. Patient denies any chest pain, shortness 

breath, severe headaches. Imaging, nursing note, chart, labs and old chart 

reviewed. Discussed with patient.





Hospitalist Physical





- Physical exam


Narrative exam: 


 Gen: WDWN, NAD, Awake, Alert, Orientated, bmi 40.4


HEENT: NCAT, EOMI, PERRL, OP Clear 


Neck: supple, no adenopathy, no thyromegaly, no JVD 


CVS/Heart: RRR, normal S1S2, pulses present bilaterally 


Chest/Lungs: CTA B, Symmetrical chest expansion, good air entry bilaterally 


GI/Abdomen: soft, NTND, good bowel sounds, no guarding or rebound 


/Bladder: no suprapubic tenderness, no CVA or paraspinal tenderness 


Extermity/Skin: no c/c/e, no obvious rash 


MSK: FROM x 4 


Neuro: CN 2-12 grossly intact, no new focal deficits 


Psych: calm 








- Constitutional


Vitals: 


                                        











Temp Pulse Resp BP Pulse Ox


 


 98.1 F   60   18   105/54   97 


 


 07/05/19 05:00  07/05/19 05:00  07/05/19 05:00  07/05/19 05:00  07/05/19 05:00











General appearance: Present: no acute distress, well-nourished





Results





- Labs


CBC & Chem 7: 


                                 07/05/19 07:38





                                 07/05/19 07:38


Labs: 


                             Laboratory Last Values











WBC  5.8 K/mm3 (4.5-11.0)   07/04/19  10:00    


 


RBC  3.84 M/mm3 (3.65-5.03)   07/04/19  10:00    


 


Hgb  12.0 gm/dl (10.1-14.3)   07/04/19  10:00    


 


Hct  35.2 % (30.3-42.9)   07/04/19  10:00    


 


MCV  92 fl (79-97)   07/04/19  10:00    


 


MCH  31 pg (28-32)   07/04/19  10:00    


 


MCHC  34 % (30-34)   07/04/19  10:00    


 


RDW  14.4 % (13.2-15.2)   07/04/19  10:00    


 


Plt Count  214 K/mm3 (140-440)   07/04/19  10:00    


 


Lymph % (Auto)  20.7 % (13.4-35.0)   07/04/19  10:00    


 


Mono % (Auto)  7.9 % (0.0-7.3)  H  07/04/19  10:00    


 


Eos % (Auto)  3.6 % (0.0-4.3)   07/04/19  10:00    


 


Baso % (Auto)  0.7 % (0.0-1.8)   07/04/19  10:00    


 


Lymph #  1.2 K/mm3 (1.2-5.4)   07/04/19  10:00    


 


Mono #  0.5 K/mm3 (0.0-0.8)   07/04/19  10:00    


 


Eos #  0.2 K/mm3 (0.0-0.4)   07/04/19  10:00    


 


Baso #  0.0 K/mm3 (0.0-0.1)   07/04/19  10:00    


 


Seg Neutrophils %  67.1 % (40.0-70.0)   07/04/19  10:00    


 


Seg Neutrophils #  3.9 K/mm3 (1.8-7.7)   07/04/19  10:00    


 


Sodium  137 mmol/L (137-145)   07/04/19  10:00    


 


Potassium  3.8 mmol/L (3.6-5.0)   07/04/19  10:00    


 


Chloride  100.4 mmol/L ()   07/04/19  10:00    


 


Carbon Dioxide  24 mmol/L (22-30)   07/04/19  10:00    


 


  16 mmol/L  07/04/19  10:00    


 


BUN  8 mg/dL (7-17)   07/04/19  10:00    


 


  0.7 mg/dL (0.7-1.2)   07/04/19  10:00    


 


Estimated GFR  > 60 ml/min  07/04/19  10:00    


 


  11 %  07/04/19  10:00    


 


Glucose  76 mg/dL ()   07/04/19  10:00    


 


  4.7 % (4-6)   07/04/19  10:00    


 


Calcium  9.0 mg/dL (8.4-10.2)   07/04/19  10:00    


 


  < 0.010 ng/mL (0.00-0.029)   07/04/19  15:16    














Active Medications





- Current Medications


Current Medications: 














Generic Name Dose Route Start Last Admin





  Trade Name Freq  PRN Reason Stop Dose Admin


 


Acetaminophen  650 mg  07/04/19 19:01 





  Tylenol  PO  





  Q4H PRN  





  Pain MILD(1-3)/Fever >100.5/HA  


 


Heparin Sodium (Porcine)  5,000 unit  07/04/19 22:00  07/04/19 21:26





  Heparin  SUB-Q   5,000 unit





  Q12HR MELISSA   Administration


 


Hydromorphone HCl  1 mg  07/04/19 19:05  07/05/19 02:41





  Dilaudid  IV   1 mg





  Q3H PRN   Administration





  Pain , Severe (7-10)  


 


Dextrose/Sodium Chloride  1,000 mls @ 100 mls/hr  07/04/19 20:00 





  D5ns  IV  





  AS DIRECT MELISSA  


 


Ondansetron HCl  4 mg  07/04/19 19:01  07/05/19 02:41





  Zofran  IV   4 mg





  Q8H PRN   Administration





  Nausea And Vomiting  


 


Pantoprazole Sodium  40 mg  07/05/19 08:00 





  Protonix  IV  





  BID MELISSA  


 


Sodium Chloride  10 ml  07/04/19 22:00  07/04/19 23:38





  Sodium Chloride Flush Syringe 10 Ml  IV   10 ml





  BID MELISSA   Administration


 


Sodium Chloride  10 ml  07/04/19 19:01 





  Sodium Chloride Flush Syringe 10 Ml  IV  





  PRN PRN  





  LINE FLUSH

## 2019-07-05 NOTE — HISTORY AND PHYSICAL REPORT
History of Present Illness


Date of examination: 07/04/19


Date of admission: 


07/04/19 19:01





Chief complaint: 


Persistent vomiting for 2 days


History of present illness: 


History of Present Illness


64-year-old female  with pmh of HTN Depression and GERD ,status post gastric 

bypass and hernia repair who presents with nausea and vomiting and inability to 

eat.  Patient states that ever since she had her surgery she's not been able to 

swallow any food or liquid down.  Patient had revision Gastric bypass  surgery 

on  July 2 nd 2019.


Abd pain 6 on a scale of 1 to 10.





Past Medical History


Hypertension


Pulmonary Embolism: Yes (bilateral 2011)


GERD


Arthritis





Surgical History


Cholecystectomy: Yes


Gastric Bypass surgery few years ago


Revision Bariatric surgery on July 2nd 2019





Social History   


Smoking Status: Never Smoker


Substance Use Type: None  





Family History


Htn








Review of Systems


ROS: 


Stated complaint: CHEST DISCOMFORT/CAN'T EAT


Other details as noted in HPI





Constitutional: denies: chills, fever


Eyes: denies: eye pain, eye discharge, vision change


ENT: denies: ear pain, throat pain


Respiratory: denies: cough, shortness of breath, wheezing


Cardiovascular: chest pain.  denies: palpitations


Endocrine: no symptoms reported


Gastrointestinal: nausea, vomiting.  denies: abdominal pain, diarrhea


Genitourinary: denies: urgency, dysuria, discharge


Musculoskeletal: denies: back pain, joint swelling, arthralgia


Skin: denies: rash, lesions


Neurological: denies: headache, weakness, paresthesias


Psychiatric: denies: anxiety, depression


Hematological/Lymphatic: denies: easy bleeding, easy bruising

















Medications and Allergies


                                    Allergies











Allergy/AdvReac Type Severity Reaction Status Date / Time


 


No Known Allergies Allergy   Verified 07/04/19 09:28











                                Home Medications











 Medication  Instructions  Recorded  Confirmed  Last Taken  Type


 


HYDROcodone/APAP  [Norco 1 tab PO BID 06/28/19 07/04/19 07/01/19 21:00 

History





 mg TAB]     


 


Losartan/Hydrochlorothiazide 1 each PO DAILY 06/28/19 07/04/19 07/01/19 21:00 

History





[Hyzaar 100-12.5 Tablet]     


 


Ranitidine HCl [Zantac] 150 mg PO DAILY 06/28/19 07/04/19 07/01/19 21:00 History


 


amLODIPine [Norvasc] 10 mg PO DAILY 06/28/19 07/04/19 07/02/19 05:15 History


 


buPROPion [Wellbutrin] 200 mg PO DAILY 06/28/19 07/04/19 07/01/19 21:00 History











Active Meds: 


Active Medications





Acetaminophen (Tylenol)  650 mg PO Q4H PRN


   PRN Reason: Pain MILD(1-3)/Fever >100.5/HA


Heparin Sodium (Porcine) (Heparin)  5,000 unit SUB-Q Q12HR [facility]


   Last Admin: 07/04/19 21:26 Dose:  5,000 unit


   Documented by: 


Hydromorphone HCl (Dilaudid)  1 mg IV Q3H PRN


   PRN Reason: Pain , Severe (7-10)


   Last Admin: 07/05/19 02:41 Dose:  1 mg


   Documented by: 


Dextrose/Sodium Chloride (D5ns)  1,000 mls @ 100 mls/hr IV AS DIRECT MELISSA


Ondansetron HCl (Zofran)  4 mg IV Q8H PRN


   PRN Reason: Nausea And Vomiting


   Last Admin: 07/05/19 02:41 Dose:  4 mg


   Documented by: 


Sodium Chloride (Sodium Chloride Flush Syringe 10 Ml)  10 ml IV BID [facility]


   Last Admin: 07/04/19 23:38 Dose:  10 ml


   Documented by: 


Sodium Chloride (Sodium Chloride Flush Syringe 10 Ml)  10 ml IV PRN PRN


   PRN Reason: LINE FLUSH











Exam





- Constitutional


Vitals: 


                                        











Temp Pulse Resp BP Pulse Ox


 


 98.1 F   60   18   105/54   97 


 


 07/05/19 05:00  07/05/19 05:00  07/05/19 05:00  07/05/19 05:00  07/05/19 05:00











General appearance: Present: no acute distress, well-nourished





- EENT


Eyes: Present: PERRL


ENT: hearing intact, clear oral mucosa





- Neck


Neck: Present: supple, normal ROM





- Respiratory


Respiratory effort: normal


Respiratory: bilateral: CTA





- Cardiovascular


Heart rate: 78


Rhythm: regular


Heart Sounds: Present: S1 & S2.  Absent: rub, click





- Extremities


Extremities: no ischemia, pulses intact, pulses symmetrical, No edema


Peripheral Pulses: within normal limits





- Abdominal


General gastrointestinal: Present: soft, non-tender, non-distended, normal bowel

 sounds


Female genitourinary: Present: normal





- Rectal


Rectal Exam: deferred





- Integumentary


Integumentary: Present: clear, warm, dry





- Musculoskeletal


Musculoskeletal: gait normal, strength equal bilaterally





- Psychiatric


Psychiatric: appropriate mood/affect, intact judgment & insight





- Neurologic


Neurologic: CNII-XII intact, moves all extremities





- Allied Health


Allied health notes reviewed: nursing, case management





Results





- Labs


CBC & Chem 7: 


                                 07/04/19 10:00





                                 07/04/19 10:00


Labs: 


                             Laboratory Last Values











WBC  5.8 K/mm3 (4.5-11.0)   07/04/19  10:00    


 


RBC  3.84 M/mm3 (3.65-5.03)   07/04/19  10:00    


 


Hgb  12.0 gm/dl (10.1-14.3)   07/04/19  10:00    


 


Hct  35.2 % (30.3-42.9)   07/04/19  10:00    


 


MCV  92 fl (79-97)   07/04/19  10:00    


 


MCH  31 pg (28-32)   07/04/19  10:00    


 


MCHC  34 % (30-34)   07/04/19  10:00    


 


RDW  14.4 % (13.2-15.2)   07/04/19  10:00    


 


Plt Count  214 K/mm3 (140-440)   07/04/19  10:00    


 


Lymph % (Auto)  20.7 % (13.4-35.0)   07/04/19  10:00    


 


Mono % (Auto)  7.9 % (0.0-7.3)  H  07/04/19  10:00    


 


Eos % (Auto)  3.6 % (0.0-4.3)   07/04/19  10:00    


 


Baso % (Auto)  0.7 % (0.0-1.8)   07/04/19  10:00    


 


Lymph #  1.2 K/mm3 (1.2-5.4)   07/04/19  10:00    


 


Mono #  0.5 K/mm3 (0.0-0.8)   07/04/19  10:00    


 


Eos #  0.2 K/mm3 (0.0-0.4)   07/04/19  10:00    


 


Baso #  0.0 K/mm3 (0.0-0.1)   07/04/19  10:00    


 


Seg Neutrophils %  67.1 % (40.0-70.0)   07/04/19  10:00    


 


Seg Neutrophils #  3.9 K/mm3 (1.8-7.7)   07/04/19  10:00    


 


Sodium  137 mmol/L (137-145)   07/04/19  10:00    


 


Potassium  3.8 mmol/L (3.6-5.0)   07/04/19  10:00    


 


Chloride  100.4 mmol/L ()   07/04/19  10:00    


 


Carbon Dioxide  24 mmol/L (22-30)   07/04/19  10:00    


 


  16 mmol/L  07/04/19  10:00    


 


BUN  8 mg/dL (7-17)   07/04/19  10:00    


 


  0.7 mg/dL (0.7-1.2)   07/04/19  10:00    


 


Estimated GFR  > 60 ml/min  07/04/19  10:00    


 


  11 %  07/04/19  10:00    


 


Glucose  76 mg/dL ()   07/04/19  10:00    


 


  4.7 % (4-6)   07/04/19  10:00    


 


Calcium  9.0 mg/dL (8.4-10.2)   07/04/19  10:00    


 


  < 0.010 ng/mL (0.00-0.029)   07/04/19  15:16    








                                    Short CBC











  07/04/19 Range/Units





  10:00 


 


WBC  5.8  (4.5-11.0)  K/mm3


 


Hgb  12.0  (10.1-14.3)  gm/dl


 


Hct  35.2  (30.3-42.9)  %


 


Plt Count  214  (140-440)  K/mm3








                                       BMP











  07/04/19





  10:00


 


Sodium  137


 


Potassium  3.8


 


Chloride  100.4


 


Carbon Dioxide  24


 


BUN  8


 


Creatinine  0.7


 


Glucose  76


 


Calcium  9.0








                                 Cardiac Enzymes











  07/04/19 07/04/19 07/04/19 Range/Units





  10:00 12:39 15:16 


 


Troponin T  < 0.010  < 0.010  < 0.010  (0.00-0.029)  ng/mL














- Imaging and Cardiology


EKG: report reviewed (NSR 64/min)


CT scan - abdomen: report reviewed


Imaging and Cardiology: 





CT abd





IMPRESSION: Changes of gastric bypass are noted. The esophagus is distended with

 air and contains a


very small amount of fluid. This could represent reflux. Possibility of 

obstruction or stricture at 


the level the gastric remnant is considered as well. 





There is no free air. There are no abnormal fluid collections. 





There is a small amount of gas in the dominant wall right. 











Assessment and Plan


Advance Directives: Yes (Full code)


VTE prophylaxis?: Chemical


Plan of care discussed with patient/family: Yes





- Patient Problems


(1) Esophageal stricture


Current Visit: Yes   Status: Acute   


Plan to address problem: 


IV protonix initiated


Clear Liquid diet


Dr Daigle group to see the patient in AM


EGD by GI if necessary


IV Zofran and Reglan








(2) Post-operative complication


Current Visit: Yes   Status: Acute   


Qualifiers: 


   Surgical complication system/body Area: digestive system 


Plan to address problem: 


As above in Esophageal stricture








(3) HTN (hypertension)


Current Visit: Yes   Status: Chronic   


Qualifiers: 


   Hypertension type: essential hypertension   Qualified Code(s): I10 - 

Essential (primary) hypertension   


Plan to address problem: 


Cont anihypertensives








(4) GERD (gastroesophageal reflux disease)


Current Visit: Yes   Status: Chronic   


Qualifiers: 


   Esophagitis presence: with esophagitis   Qualified Code(s): K21.0 - Gastro-

esophageal reflux disease with esophagitis   


Plan to address problem: 


On IV Protonix








(5) Depression


Current Visit: Yes   Status: Chronic   


Qualifiers: 


   Depression Type: unspecified   Qualified Code(s): F32.9 - Major depressive d

isorder, single episode, unspecified   


Plan to address problem: 


Cont wellbutrin








(6) DVT prophylaxis


Current Visit: Yes   Status: Acute   


Plan to address problem: 


On Lovernox

## 2019-07-06 LAB
BUN SERPL-MCNC: 3 MG/DL (ref 7–17)
BUN/CREAT SERPL: 5 %
CALCIUM SERPL-MCNC: 8.4 MG/DL (ref 8.4–10.2)
HCT VFR BLD CALC: 30.8 % (ref 30.3–42.9)
HEMOLYSIS INDEX: 6
HGB BLD-MCNC: 10.3 GM/DL (ref 10.1–14.3)
MCHC RBC AUTO-ENTMCNC: 33 % (ref 30–34)
MCV RBC AUTO: 92 FL (ref 79–97)
PLATELET # BLD: 184 K/MM3 (ref 140–440)
RBC # BLD AUTO: 3.34 M/MM3 (ref 3.65–5.03)

## 2019-07-06 RX ADMIN — HYDROMORPHONE HYDROCHLORIDE PRN MG: 1 INJECTION, SOLUTION INTRAMUSCULAR; INTRAVENOUS; SUBCUTANEOUS at 21:07

## 2019-07-06 RX ADMIN — ONDANSETRON PRN MG: 2 INJECTION INTRAMUSCULAR; INTRAVENOUS at 21:06

## 2019-07-06 RX ADMIN — ONDANSETRON PRN MG: 2 INJECTION INTRAMUSCULAR; INTRAVENOUS at 16:37

## 2019-07-06 RX ADMIN — HYDROMORPHONE HYDROCHLORIDE PRN MG: 1 INJECTION, SOLUTION INTRAMUSCULAR; INTRAVENOUS; SUBCUTANEOUS at 12:17

## 2019-07-06 RX ADMIN — PANTOPRAZOLE SODIUM SCH MG: 40 INJECTION, POWDER, FOR SOLUTION INTRAVENOUS at 10:54

## 2019-07-06 RX ADMIN — ONDANSETRON PRN MG: 2 INJECTION INTRAMUSCULAR; INTRAVENOUS at 12:17

## 2019-07-06 RX ADMIN — HYDROMORPHONE HYDROCHLORIDE PRN MG: 1 INJECTION, SOLUTION INTRAMUSCULAR; INTRAVENOUS; SUBCUTANEOUS at 08:14

## 2019-07-06 RX ADMIN — HEPARIN SODIUM SCH UNIT: 5000 INJECTION, SOLUTION INTRAVENOUS; SUBCUTANEOUS at 21:06

## 2019-07-06 RX ADMIN — HEPARIN SODIUM SCH UNIT: 5000 INJECTION, SOLUTION INTRAVENOUS; SUBCUTANEOUS at 10:54

## 2019-07-06 RX ADMIN — PANTOPRAZOLE SODIUM SCH MG: 40 INJECTION, POWDER, FOR SOLUTION INTRAVENOUS at 21:06

## 2019-07-06 RX ADMIN — Medication SCH ML: at 10:54

## 2019-07-06 RX ADMIN — ONDANSETRON PRN MG: 2 INJECTION INTRAMUSCULAR; INTRAVENOUS at 01:41

## 2019-07-06 RX ADMIN — HYDROMORPHONE HYDROCHLORIDE PRN MG: 1 INJECTION, SOLUTION INTRAMUSCULAR; INTRAVENOUS; SUBCUTANEOUS at 01:42

## 2019-07-06 RX ADMIN — DEXTROSE AND SODIUM CHLORIDE SCH MLS/HR: 5; .9 INJECTION, SOLUTION INTRAVENOUS at 16:37

## 2019-07-06 RX ADMIN — Medication SCH ML: at 21:14

## 2019-07-06 RX ADMIN — HYDROMORPHONE HYDROCHLORIDE PRN MG: 1 INJECTION, SOLUTION INTRAMUSCULAR; INTRAVENOUS; SUBCUTANEOUS at 16:37

## 2019-07-06 RX ADMIN — ONDANSETRON PRN MG: 2 INJECTION INTRAMUSCULAR; INTRAVENOUS at 08:14

## 2019-07-06 NOTE — PROGRESS NOTE
Assessment and Plan


Assessment and plan: 


Patient is a 64 yo woman with a history of HTN, PE in 2011, OA, Depression, 

GERD, status post gastric bypass and hernia repair who presents with nausea, 

vomiting and inability to eat after Gastric bypass revision surgery on July 2 nd 2019.





* pCXR Impression: There is a small amount of right pleural fluid or pleural 

  thickening, no pneumothorax is seen. 


* CT abd/pelvis with IV IMPRESSION: Changes of gastric bypass are noted. The 

  esophagus is distended with air and contains a very small amount of fluid. 

  This could represent reflux. Possibility of obstruction or stricture at the 

  level the gastric remnant is considered as well. There is no free air. There 

  are no abnormal fluid collections. There is a small amount of gas in the 

  dominant wall right. 





(1) Esophageal stricture, 


Current Visit: Yes   Status: Acute   


Plan to address problem: 


IV protonix initiated


Clear Liquid diet


Dr Daigle group to see, management per Bariatric surgery


Consulted GI, input noted


IV Zofran and Reglan





(2) Post-operative complication


Current Visit: Yes   Status: Acute   


Qualifiers: 


   Surgical complication system/body Area: digestive system 


Plan to address problem: 


As above in Esophageal stricture





(3) HTN (hypertension)


Current Visit: Yes   Status: Chronic   


Qualifiers: 


   Hypertension type: essential hypertension   Qualified Code(s): I10 - 

Essential (primary) hypertension   


Plan to address problem: 


Cont anihypertensives





(4) GERD (gastroesophageal reflux disease)


Current Visit: Yes   Status: Chronic   


Qualifiers: 


   Esophagitis presence: with esophagitis   Qualified Code(s): K21.0 - Gastro-es

ophageal reflux disease with esophagitis   


Plan to address problem: 


On IV Protonix





(5) Depression


Current Visit: Yes   Status: Chronic   


Qualifiers: 


   Depression Type: unspecified   Qualified Code(s): F32.9 - Major depressive 

disorder, single episode, unspecified   


Plan to address problem: 


Cont wellbutrin





(6) DVT prophylaxis


Current Visit: Yes   Status: Acute   


Plan to address problem: 


On Lovernox





Anticipate discharge tomorrow per Bariatric surgeon





History


Interval history: 


Patient was seen and examined. Follow-up on current diagnosis of N/V. No 

overnight events reported to me. Patient denies any chest pain, shortness 

breath, severe headaches. Imaging, nursing note, chart, labs and old chart 

reviewed. Discussed with patient.





Hospitalist Physical





- Physical exam


Narrative exam: 


 Gen: WDWN, NAD, Awake, Alert, Orientated, bmi 40.4


HEENT: NCAT, EOMI, PERRL, OP Clear 


Neck: supple, no adenopathy, no thyromegaly, no JVD 


CVS/Heart: RRR, normal S1S2, pulses present bilaterally 


Chest/Lungs: CTA B, Symmetrical chest expansion, good air entry bilaterally 


GI/Abdomen: soft, NTND, good bowel sounds, no guarding or rebound 


/Bladder: no suprapubic tenderness, no CVA or paraspinal tenderness 


Extermity/Skin: no c/c/e, no obvious rash 


MSK: FROM x 4 


Neuro: CN 2-12 grossly intact, no new focal deficits 


Psych: calm 








- Constitutional


Vitals: 


                                        











Temp Pulse Resp BP Pulse Ox


 


 98 F   55 L  18   124/50   100 


 


 07/06/19 12:00  07/06/19 12:00  07/06/19 12:47  07/06/19 12:00  07/06/19 12:00











General appearance: Present: no acute distress, well-nourished





Results





- Labs


CBC & Chem 7: 


                                 07/06/19 03:50





                                 07/06/19 03:50


Labs: 


                             Laboratory Last Values











WBC  4.2 K/mm3 (4.5-11.0)  L  07/06/19  03:50    


 


RBC  3.34 M/mm3 (3.65-5.03)  L  07/06/19  03:50    


 


Hgb  10.3 gm/dl (10.1-14.3)   07/06/19  03:50    


 


Hct  30.8 % (30.3-42.9)   07/06/19  03:50    


 


MCV  92 fl (79-97)   07/06/19  03:50    


 


MCH  31 pg (28-32)   07/06/19  03:50    


 


MCHC  33 % (30-34)   07/06/19  03:50    


 


RDW  14.4 % (13.2-15.2)   07/06/19  03:50    


 


Plt Count  184 K/mm3 (140-440)   07/06/19  03:50    


 


Lymph % (Auto)  26.4 % (13.4-35.0)   07/05/19  07:38    


 


Mono % (Auto)  12.1 % (0.0-7.3)  H  07/05/19  07:38    


 


Eos % (Auto)  9.0 % (0.0-4.3)  H  07/05/19  07:38    


 


Baso % (Auto)  0.6 % (0.0-1.8)   07/05/19  07:38    


 


Lymph #  1.2 K/mm3 (1.2-5.4)   07/05/19  07:38    


 


Mono #  0.5 K/mm3 (0.0-0.8)   07/05/19  07:38    


 


Eos #  0.4 K/mm3 (0.0-0.4)   07/05/19  07:38    


 


Baso #  0.0 K/mm3 (0.0-0.1)   07/05/19  07:38    


 


Seg Neutrophils %  51.9 % (40.0-70.0)   07/05/19  07:38    


 


Seg Neutrophils #  2.3 K/mm3 (1.8-7.7)   07/05/19  07:38    


 


Sodium  141 mmol/L (137-145)   07/06/19  03:50    


 


Potassium  3.4 mmol/L (3.6-5.0)  L  07/06/19  03:50    


 


Chloride  108.0 mmol/L ()  H  07/06/19  03:50    


 


Carbon Dioxide  25 mmol/L (22-30)   07/06/19  03:50    


 


  11 mmol/L  07/06/19  03:50    


 


BUN  3 mg/dL (7-17)  L  07/06/19  03:50    


 


  0.6 mg/dL (0.7-1.2)  L  07/06/19  03:50    


 


Estimated GFR  > 60 ml/min  07/06/19  03:50    


 


  5 %  07/06/19  03:50    


 


Glucose  108 mg/dL ()  H  07/06/19  03:50    


 


  4.7 % (4-6)   07/04/19  10:00    


 


Calcium  8.4 mg/dL (8.4-10.2)   07/06/19  03:50    


 


Magnesium  1.60 mg/dL (1.7-2.3)  L  07/06/19  03:50    


 


  0.50 mg/dL (0.1-1.2)   07/05/19  07:38    


 


AST  22 units/L (5-40)   07/05/19  07:38    


 


ALT  21 units/L (7-56)   07/05/19  07:38    


 


  49 units/L ()   07/05/19  07:38    


 


  < 0.010 ng/mL (0.00-0.029)   07/04/19  15:16    


 


  6.2 g/dL (6.3-8.2)  L  07/05/19  07:38    


 


  3.0 g/dL (3.9-5)  L  07/05/19  07:38    


 


  0.9 %  07/05/19  07:38    














Active Medications





- Current Medications


Current Medications: 














Generic Name Dose Route Start Last Admin





  Trade Name Freq  PRN Reason Stop Dose Admin


 


Acetaminophen  650 mg  07/04/19 19:01 





  Tylenol  PO  





  Q4H PRN  





  Pain MILD(1-3)/Fever >100.5/HA  


 


Heparin Sodium (Porcine)  5,000 unit  07/04/19 22:00  07/06/19 10:54





  Heparin  SUB-Q   5,000 unit





  Q12HR MELISSA   Administration


 


Hydromorphone HCl  1 mg  07/04/19 19:05  07/06/19 12:17





  Dilaudid  IV   1 mg





  Q3H PRN   Administration





  Pain , Severe (7-10)  


 


Dextrose/Sodium Chloride  1,000 mls @ 100 mls/hr  07/04/19 20:00  07/05/19 21:07





  D5ns  IV   100 mls/hr





  AS DIRECT MELISSA   Administration


 


Ondansetron HCl  4 mg  07/05/19 11:03  07/06/19 12:17





  Zofran  IV   4 mg





  Q4H PRN   Administration





  Nausea And Vomiting  


 


Pantoprazole Sodium  40 mg  07/05/19 08:00  07/06/19 10:54





  Protonix  IV   40 mg





  BID MELISSA   Administration


 


Sodium Chloride  10 ml  07/04/19 22:00  07/06/19 10:54





  Sodium Chloride Flush Syringe 10 Ml  IV   10 ml





  BID MELISSA   Administration


 


Sodium Chloride  10 ml  07/04/19 19:01 





  Sodium Chloride Flush Syringe 10 Ml  IV  





  PRN PRN  





  LINE FLUSH

## 2019-07-06 NOTE — PROGRESS NOTE
Assessment and Plan





64F patient of Dr Saha s/p lap GJ revision with HHR 7/2/19, readmitted with 

nausea and vomiting, likely from GJ edema.





#1 Nausea/vomiting


#2 GJ edema 


#3 s/p lap GJ revision/HHR 7/2


- Patient may have a bariatric clear liquid diet as tolerated


- IV PPI --> transition to po meds 


- Cont IVFs


- all medications should be crushed 


- ambulate ad lonnie


- IS to patient 


- She is improving and able to tolerate a CLD. She is apprehensive about going 

home. She would like to stay another day and dc tomorrow. Replete electrolytes 

per hospitalist. 











Hugh Lazcano MD


Bariatric Fellow  





Subjective


Date of service: 07/06/19


Principal diagnosis: Nausea and vomiting


Interval history: 





No acute events. She was able to keep down clear fluids and feels like things 

are moving down now, but did have some nausea this morning. No vomiting. No abd 

or chest pain. 





Objective





- Exam


Narrative Exam: 





Gen: AAO, NAD


HEENT: Trachea midline, no masses


Heart: RRR


Lungs: CTAB, no wheezes, no rales


Abd: Soft, obese, NT, ND. Bandages c/d/i.


Ext: No LE edema





- Constitutional


Vitals: 


                               Vital Signs - 12hr











  07/05/19 07/05/19 07/06/19





  22:00 23:46 01:42


 


Temperature  97.9 F 


 


Pulse Rate  54 L 


 


Respiratory  18 17





Rate   


 


Respiratory 17  





Rate [Chest]   


 


Blood Pressure  105/46 


 


Blood Pressure   





[Right]   


 


O2 Sat by Pulse  99 





Oximetry   














  07/06/19 07/06/19 07/06/19





  02:12 04:03 05:53


 


Temperature  97.8 F 


 


Pulse Rate  52 L 


 


Respiratory 17 17 





Rate   


 


Respiratory   





Rate [Chest]   


 


Blood Pressure  97/39 


 


Blood Pressure   127/49





[Right]   


 


O2 Sat by Pulse  95 





Oximetry   














  07/06/19 07/06/19





  07:00 08:14


 


Temperature 97.9 F 


 


Pulse Rate 59 L 


 


Respiratory 18 18





Rate  


 


Respiratory  





Rate [Chest]  


 


Blood Pressure 114/57 


 


Blood Pressure  





[Right]  


 


O2 Sat by Pulse 100 





Oximetry  














- Labs


CBC & Chem 7: 


                                 07/06/19 03:50





                                 07/06/19 03:50


Labs: 


                              Abnormal lab results











  07/05/19 07/05/19 07/06/19 Range/Units





  07:38 07:38 03:50 


 


WBC  4.4 L   4.2 L  (4.5-11.0)  K/mm3


 


RBC  3.58 L   3.34 L  (3.65-5.03)  M/mm3


 


Mono % (Auto)  12.1 H    (0.0-7.3)  %


 


Eos % (Auto)  9.0 H    (0.0-4.3)  %


 


Potassium     (3.6-5.0)  mmol/L


 


Chloride     ()  mmol/L


 


BUN   6 L   (7-17)  mg/dL


 


Creatinine   0.6 L   (0.7-1.2)  mg/dL


 


Glucose   122 H   ()  mg/dL


 


Magnesium     (1.7-2.3)  mg/dL


 


Total Protein   6.2 L   (6.3-8.2)  g/dL


 


Albumin   3.0 L   (3.9-5)  g/dL














  07/06/19 Range/Units





  03:50 


 


WBC   (4.5-11.0)  K/mm3


 


RBC   (3.65-5.03)  M/mm3


 


Mono % (Auto)   (0.0-7.3)  %


 


Eos % (Auto)   (0.0-4.3)  %


 


Potassium  3.4 L  (3.6-5.0)  mmol/L


 


Chloride  108.0 H  ()  mmol/L


 


BUN  3 L  (7-17)  mg/dL


 


Creatinine  0.6 L  (0.7-1.2)  mg/dL


 


Glucose  108 H  ()  mg/dL


 


Magnesium  1.60 L  (1.7-2.3)  mg/dL


 


Total Protein   (6.3-8.2)  g/dL


 


Albumin   (3.9-5)  g/dL














Medications & Allergies





- Medications


Allergies/Adverse Reactions: 


                                    Allergies





No Known Allergies Allergy (Verified 07/04/19 09:28)


   








Home Medications: 


                                Home Medications











 Medication  Instructions  Recorded  Confirmed  Last Taken  Type


 


HYDROcodone/APAP  [Norco 1 tab PO BID 06/28/19 07/04/19 07/01/19 21:00 

History





 mg TAB]     


 


Losartan/Hydrochlorothiazide 1 each PO DAILY 06/28/19 07/04/19 07/01/19 21:00 

History





[Hyzaar 100-12.5 Tablet]     


 


Ranitidine HCl [Zantac] 150 mg PO DAILY 06/28/19 07/04/19 07/01/19 21:00 History


 


amLODIPine [Norvasc] 10 mg PO DAILY 06/28/19 07/04/19 07/02/19 05:15 History


 


buPROPion [Wellbutrin] 200 mg PO DAILY 06/28/19 07/04/19 07/01/19 21:00 History











Active Medications: 














Generic Name Dose Route Start Last Admin





  Trade Name Freq  PRN Reason Stop Dose Admin


 


Acetaminophen  650 mg  07/04/19 19:01 





  Tylenol  PO  





  Q4H PRN  





  Pain MILD(1-3)/Fever >100.5/HA  


 


Heparin Sodium (Porcine)  5,000 unit  07/04/19 22:00  07/05/19 21:05





  Heparin  SUB-Q   5,000 unit





  Q12HR MELISSA   Administration


 


Hydromorphone HCl  1 mg  07/04/19 19:05  07/06/19 08:14





  Dilaudid  IV   1 mg





  Q3H PRN   Administration





  Pain , Severe (7-10)  


 


Dextrose/Sodium Chloride  1,000 mls @ 100 mls/hr  07/04/19 20:00  07/05/19 21:07





  D5ns  IV   100 mls/hr





  AS DIRECT MELISSA   Administration


 


Ondansetron HCl  4 mg  07/05/19 11:03  07/06/19 08:14





  Zofran  IV   4 mg





  Q4H PRN   Administration





  Nausea And Vomiting  


 


Pantoprazole Sodium  40 mg  07/05/19 08:00  07/05/19 21:05





  Protonix  IV   40 mg





  BID MELISSA   Administration


 


Sodium Chloride  10 ml  07/04/19 22:00  07/05/19 21:14





  Sodium Chloride Flush Syringe 10 Ml  IV   10 ml





  BID MELISSA   Administration


 


Sodium Chloride  10 ml  07/04/19 19:01 





  Sodium Chloride Flush Syringe 10 Ml  IV  





  PRN PRN  





  LINE FLUSH

## 2019-07-07 VITALS — SYSTOLIC BLOOD PRESSURE: 118 MMHG | DIASTOLIC BLOOD PRESSURE: 59 MMHG

## 2019-07-07 RX ADMIN — ONDANSETRON PRN MG: 2 INJECTION INTRAMUSCULAR; INTRAVENOUS at 06:52

## 2019-07-07 RX ADMIN — DEXTROSE AND SODIUM CHLORIDE SCH MLS/HR: 5; .9 INJECTION, SOLUTION INTRAVENOUS at 00:51

## 2019-07-07 RX ADMIN — HYDROMORPHONE HYDROCHLORIDE PRN MG: 1 INJECTION, SOLUTION INTRAMUSCULAR; INTRAVENOUS; SUBCUTANEOUS at 16:04

## 2019-07-07 RX ADMIN — DEXTROSE AND SODIUM CHLORIDE SCH MLS/HR: 5; .9 INJECTION, SOLUTION INTRAVENOUS at 11:43

## 2019-07-07 RX ADMIN — PANTOPRAZOLE SODIUM SCH MG: 40 INJECTION, POWDER, FOR SOLUTION INTRAVENOUS at 09:35

## 2019-07-07 RX ADMIN — Medication SCH ML: at 09:35

## 2019-07-07 RX ADMIN — ONDANSETRON PRN MG: 2 INJECTION INTRAMUSCULAR; INTRAVENOUS at 11:43

## 2019-07-07 RX ADMIN — ONDANSETRON PRN MG: 2 INJECTION INTRAMUSCULAR; INTRAVENOUS at 00:45

## 2019-07-07 RX ADMIN — HYDROMORPHONE HYDROCHLORIDE PRN MG: 1 INJECTION, SOLUTION INTRAMUSCULAR; INTRAVENOUS; SUBCUTANEOUS at 06:53

## 2019-07-07 RX ADMIN — HYDROMORPHONE HYDROCHLORIDE PRN MG: 1 INJECTION, SOLUTION INTRAMUSCULAR; INTRAVENOUS; SUBCUTANEOUS at 00:45

## 2019-07-07 RX ADMIN — HEPARIN SODIUM SCH UNIT: 5000 INJECTION, SOLUTION INTRAVENOUS; SUBCUTANEOUS at 09:35

## 2019-07-07 RX ADMIN — HYDROMORPHONE HYDROCHLORIDE PRN MG: 1 INJECTION, SOLUTION INTRAMUSCULAR; INTRAVENOUS; SUBCUTANEOUS at 11:43

## 2019-07-07 RX ADMIN — ONDANSETRON PRN MG: 2 INJECTION INTRAMUSCULAR; INTRAVENOUS at 16:04

## 2019-07-07 NOTE — PROGRESS NOTE
Assessment and Plan





64F patient of Dr Saha s/p lap GJ revision with HHR 7/2/19, readmitted with 

nausea and vomiting, likely from GJ edema.





#1 Nausea/vomiting


#2 GJ edema 


#3 s/p lap GJ revision/HHR 7/2


- Bariatric clear liquid diet as tolerated


- PPI 


- Cont IVFs


- all medications should be crushed 


- ambulate ad lonnie


- IS to patient 


- May dc home from surgery perspective. She already has a f/u appt with Dr Saha. 








Hugh Lazcano MD


Bariatric Fellow  





Subjective


Date of service: 07/07/19


Principal diagnosis: Nausea and vomiting


Interval history: 





Patient tolerated CLD. No major events or changes. 





Objective





- Exam


Narrative Exam: 





Gen: AAO, NAD


HEENT: Trachea midline, no masses


Heart: RRR


Lungs: CTAB, no wheezes, no rales


Abd: Soft, obese, NT, ND. Bandages c/d/i.


Ext: No LE edema





- Constitutional


Vitals: 


                               Vital Signs - 12hr











  07/06/19 07/06/19 07/06/19





  12:47 15:11 19:43


 


Temperature  98.0 F 


 


Pulse Rate  61 


 


Respiratory 18 18 17





Rate   


 


Respiratory   





Rate [Chest]   


 


Blood Pressure  113/51 


 


O2 Sat by Pulse  98 





Oximetry   














  07/06/19 07/06/19 07/06/19





  19:51 20:46 20:47


 


Temperature  97.9 F 


 


Pulse Rate  59 L 60


 


Respiratory  18 





Rate   


 


Respiratory 17  





Rate [Chest]   


 


Blood Pressure  119/57 119/57


 


O2 Sat by Pulse  97 97





Oximetry   














  07/06/19 07/07/19





  21:07 00:10


 


Temperature  98.7 F


 


Pulse Rate  52 L


 


Respiratory 17 18





Rate  


 


Respiratory  





Rate [Chest]  


 


Blood Pressure  106/53


 


O2 Sat by Pulse  98





Oximetry  














- Labs


CBC & Chem 7: 


                                 07/06/19 03:50





                                 07/06/19 03:50


Labs: 


                              Abnormal lab results











  07/06/19 07/06/19 Range/Units





  03:50 03:50 


 


WBC  4.2 L   (4.5-11.0)  K/mm3


 


RBC  3.34 L   (3.65-5.03)  M/mm3


 


Potassium   3.4 L  (3.6-5.0)  mmol/L


 


Chloride   108.0 H  ()  mmol/L


 


BUN   3 L  (7-17)  mg/dL


 


Creatinine   0.6 L  (0.7-1.2)  mg/dL


 


Glucose   108 H  ()  mg/dL


 


Magnesium   1.60 L  (1.7-2.3)  mg/dL














Medications & Allergies





- Medications


Allergies/Adverse Reactions: 


                                    Allergies





No Known Allergies Allergy (Verified 07/04/19 09:28)


   








Home Medications: 


                                Home Medications











 Medication  Instructions  Recorded  Confirmed  Last Taken  Type


 


HYDROcodone/APAP  [Norco 1 tab PO BID 06/28/19 07/04/19 07/01/19 21:00 

History





 mg TAB]     


 


Losartan/Hydrochlorothiazide 1 each PO DAILY 06/28/19 07/04/19 07/01/19 21:00 

History





[Hyzaar 100-12.5 Tablet]     


 


Ranitidine HCl [Zantac] 150 mg PO DAILY 06/28/19 07/04/19 07/01/19 21:00 History


 


amLODIPine [Norvasc] 10 mg PO DAILY 06/28/19 07/04/19 07/02/19 05:15 History


 


buPROPion [Wellbutrin] 200 mg PO DAILY 06/28/19 07/04/19 07/01/19 21:00 History











Active Medications: 














Generic Name Dose Route Start Last Admin





  Trade Name Freq  PRN Reason Stop Dose Admin


 


Acetaminophen  650 mg  07/04/19 19:01 





  Tylenol  PO  





  Q4H PRN  





  Pain MILD(1-3)/Fever >100.5/HA  


 


Heparin Sodium (Porcine)  5,000 unit  07/04/19 22:00  07/06/19 21:06





  Heparin  SUB-Q   5,000 unit





  Q12HR MELISSA   Administration


 


Hydromorphone HCl  1 mg  07/04/19 19:05  07/06/19 21:07





  Dilaudid  IV   1 mg





  Q3H PRN   Administration





  Pain , Severe (7-10)  


 


Dextrose/Sodium Chloride  1,000 mls @ 100 mls/hr  07/04/19 20:00  07/06/19 16:37





  D5ns  IV   100 mls/hr





  AS DIRECT MELISSA   Administration


 


Ondansetron HCl  4 mg  07/05/19 11:03  07/06/19 21:06





  Zofran  IV   4 mg





  Q4H PRN   Administration





  Nausea And Vomiting  


 


Pantoprazole Sodium  40 mg  07/05/19 08:00  07/06/19 21:06





  Protonix  IV   40 mg





  BID MELISSA   Administration


 


Sodium Chloride  10 ml  07/04/19 22:00  07/06/19 21:14





  Sodium Chloride Flush Syringe 10 Ml  IV   10 ml





  BID MELISSA   Administration


 


Sodium Chloride  10 ml  07/04/19 19:01 





  Sodium Chloride Flush Syringe 10 Ml  IV  





  PRN PRN  





  LINE FLUSH

## 2019-07-07 NOTE — DISCHARGE SUMMARY
Providers





- Providers


Date of Admission: 


07/04/19 19:01





Date of discharge: 07/07/19


Attending physician: 


KRISTOPHER CARDOSO





                                        





07/04/19 13:20


Consult to Physician [CONS] Stat 


   Comment: 


   Consulting Provider: TREVER LAYNE


   Physician Instructions: 


   Reason For Exam: nausea and vomiting





07/05/19 14:30


Consult to Physician [CONS] Routine 


   Comment: 


   Consulting Provider: BILL RODRIGUEZ


   Physician Instructions: 


   Reason For Exam: Esophageal stricture











Primary care physician: 


[facility], MD








Hospitalization


Condition: Stable


Hospital course: 


Patient is a 62 yo woman with a history of HTN, PE in 2011, OA, Depression, 

GERD, status post gastric bypass and hernia repair who presents with nausea, 

vomiting and inability to eat after Gastric bypass revision surgery/lap 

gastrojejunostomy revision and hiatal hernia repair on 7/2/2019 with Dr. Saha 

admitted for nausea/vomiting





* pCXR Impression: There is a small amount of right pleural fluid or pleural 

  thickening, no pneumothorax is seen. 


* CT abd/pelvis with IV IMPRESSION: Changes of gastric bypass are noted. The 

  esophagus is distended with air and contains a very small amount of fluid. 

  This could represent reflux. Possibility of obstruction or stricture at the 

  level the gastric remnant is considered as well. There is no free air. There 

  are no abnormal fluid collections. There is a small amount of gas in the 

  dominant wall right. 





Discharge Diagnoses:


Nausea/Vomiting likely 2/2 post surgical changes and edema at the distal es

ophagus and repair site per GI without mentioning of esophageal stricture. 


Post-operative complication


HTN (hypertension)


GERD (gastroesophageal reflux disease)


Morbid Obesity, bmi 41.8


Depression, no SI reported





Disposition: DC-01 TO HOME OR SELFCARE


Time spent for discharge: 35 minutes





Core Measure Documentation





- Palliative Care


Palliative Care/ Comfort Measures: Not Applicable





- Core Measures


Any of the following diagnoses?: none





- VTE Discharge Requirements


Deep Vein Thrombosis/Pulmonary Embolism Present on Admission: No


Has pt received <5 days of overlap therapy or INR<2.0: No


Anticoagulant overlap therapy prescribed at discharge: No


Contraindication No Overlap Therapy order at DC: Not Indicated





Exam





- Physical Exam


Narrative exam: 


 Gen: WDWN, NAD, Awake, Alert, Orientated, bmi 40.4


HEENT: NCAT, EOMI, PERRL, OP Clear 


Neck: supple, no adenopathy, no thyromegaly, no JVD 


CVS/Heart: RRR, normal S1S2, pulses present bilaterally 


Chest/Lungs: CTA B, Symmetrical chest expansion, good air entry bilaterally 


GI/Abdomen: soft, NTND, good bowel sounds, no guarding or rebound 


/Bladder: no suprapubic tenderness, no CVA or paraspinal tenderness 


Extermity/Skin: no c/c/e, no obvious rash 


MSK: FROM x 4 


Neuro: CN 2-12 grossly intact, no new focal deficits 


Psych: calm 








- Constitutional


Vitals: 


                                        











Temp Pulse Resp BP Pulse Ox


 


 98.0 F   53 L  18   119/55   99 


 


 07/07/19 07:36  07/07/19 07:36  07/07/19 11:43  07/07/19 07:36  07/07/19 07:36














Plan


Activity: other (no strenous activty until cleared by PCP)


Diet: clear liquids, advance as tolerated


Special Instructions: record daily BP diary


Additional Instructions: see PCP prior to re-starting bp medications.


[facility] 


[facility] - 3-5 Days


TREVER LAYNE MD [Staff Physician] - 7 Days